# Patient Record
Sex: FEMALE | Employment: FULL TIME | URBAN - METROPOLITAN AREA
[De-identification: names, ages, dates, MRNs, and addresses within clinical notes are randomized per-mention and may not be internally consistent; named-entity substitution may affect disease eponyms.]

---

## 2017-03-17 ENCOUNTER — HOSPITAL ENCOUNTER (EMERGENCY)
Age: 37
Discharge: HOME | End: 2017-03-17
Attending: OBSTETRICS & GYNECOLOGY | Admitting: OBSTETRICS & GYNECOLOGY
Payer: COMMERCIAL

## 2017-03-17 VITALS
WEIGHT: 207 LBS | BODY MASS INDEX: 29.63 KG/M2 | SYSTOLIC BLOOD PRESSURE: 119 MMHG | TEMPERATURE: 98.4 F | HEART RATE: 94 BPM | DIASTOLIC BLOOD PRESSURE: 67 MMHG | HEIGHT: 70 IN | RESPIRATION RATE: 18 BRPM

## 2017-03-17 LAB
DEPRECATED S PYO AG THROAT QL EIA: NEGATIVE
FLUAV AG NPH QL IA: NEGATIVE
FLUBV AG NPH QL IA: POSITIVE

## 2017-03-17 PROCEDURE — 87804 INFLUENZA ASSAY W/OPTIC: CPT | Performed by: OBSTETRICS & GYNECOLOGY

## 2017-03-17 PROCEDURE — 87880 STREP A ASSAY W/OPTIC: CPT | Performed by: OBSTETRICS & GYNECOLOGY

## 2017-03-17 PROCEDURE — 59025 FETAL NON-STRESS TEST: CPT

## 2017-03-17 PROCEDURE — 87081 CULTURE SCREEN ONLY: CPT | Performed by: OBSTETRICS & GYNECOLOGY

## 2017-03-17 PROCEDURE — 99283 EMERGENCY DEPT VISIT LOW MDM: CPT

## 2017-03-17 RX ORDER — OSELTAMIVIR PHOSPHATE 75 MG/1
75 CAPSULE ORAL 2 TIMES DAILY
Qty: 10 CAP | Refills: 0 | Status: SHIPPED | OUTPATIENT
Start: 2017-03-17 | End: 2017-03-22

## 2017-03-17 NOTE — IP AVS SNAPSHOT
Summary of Care Report The Summary of Care report has been created to help improve care coordination. Users with access to [employer] or [address] (Web-based application) may access additional patient information including the Discharge Summary. If you are not currently a [address] user and need more information, please call the number listed below in the [place] [address] section and ask to be connected with Medical Records. Facility Information Name Address Phone [address] [place] [address] [address] [place] [id number] [phone] Patient Information Patient Name Sex EUGENIA Sandoval ([id number]) Female 1980 Discharge Information Admitting Provider Service Area Unit Johana Badillo MD / [facility] / [phone] Discharge Provider Discharge Date/Time Discharge Disposition Destination (none) 3/17/2017 (Pending) AHR (none) Patient Language Language ENGLISH [13] Hospital Problems as of 3/17/2017  Reviewed: 3/15/2017  8:49 AM by Lady Karoline MD  
 None Non-Hospital Problems as of 3/17/2017  Reviewed: 3/15/2017  8:49 AM by Lady Karoline MD  
  
  
  
 Class Noted - Resolved Last Modified Active Problems History of incompetent cervix, currently pregnant in first trimester  2016 - Present 2016 by Lauro Armstrong MD  
  Entered by Callie Ohara Overview Addendum 2016 12:15 PM by Lauro Armstrong MD  
   Cerclage placed on 10/31. 
2016 at [place]:  CL 3.8 cm 
2016 at [place]:  CL 4.5 cm, no PTL symptoms, no further prolapse, not using pessary. · Continue 17P weekly injections to 37 weeks. · No prolapse symptoms now, will continue to hold off on pessary. · PTL and cervical insufficiency precautions given. Previous  delivery with history of rapid labor  2016 - Present 2016 by Jourdan Mitchell MD  
  Entered by Ysabel Brandie Supervision of high-risk pregnancy  10/10/2016 - Present 2016 by Joudran Mitchell MD  
  Entered by Javier Martinez DO Overview Signed 10/12/2016 10:03 AM by Selam Carter MD  
   Poor OB history with cervical insufficiency [facility] Elderly multigravida in second trimester  10/12/2016 - Present 2016 by Jourdan Mitchell MD  
  Entered by Selam Carter MD  
  Overview Addendum 2016 12:16 PM by Jourdan Mitchell MD  
   Normal NT at 13wk; declined genetic testing.  at [facility]:  Normal Anatomy/fetal Echo · Continue Vitamin D 2000IU and ASA 81mg daily to aid in prevention of preeclampsia. · Stop ASA at 36 weeks; may continue Vitamin D until delivery. · Preeclamptic warnings given. Prolapse of female pelvic organs  2016 - Present 2016 by Jourdan Mitchell MD  
  Entered by Akbar Edward RN Overview Addendum 2016 10:17 AM by Akbar Edward RN Has Tony-Barron pessary; not currently in place. Patient denies bladder prolapse or difficulty voiding. You are allergic to the following No active allergies Current Discharge Medication List  
  
START taking these medications Dose & Instructions Dispensing Information Comments  
 oseltamivir 75 mg capsule Commonly known as:  TAMIFLU Dose:  75 mg Take 1 Cap by mouth two (2) times a day for 5 days. Quantity:  10 Cap Refills:  0 CONTINUE these medications which have NOT CHANGED Dose & Instructions Dispensing Information Comments  
 aspirin delayed-release 81 mg tablet Take  by mouth daily. Refills:  0  
   
 calcium carbonate 200 mg calcium (500 mg) Chew Commonly known as:  TUMS Dose:  1 Tab Take 1 Tab by mouth daily. Refills:  0 HYDROXYprogesterone caproate 250 mg/mL Syrg injection Dose:  250 mg  
250 mg by IntraMUSCular route every seven (7) days. Refills:  0 PRENATAL PO Take  by mouth. Refills:  0  
   
 TYLENOL EXTRA STRENGTH 500 mg tablet Generic drug:  acetaminophen Take  by mouth every six (6) hours as needed for Pain. Refills:  0  
   
 VITAMIN D3 2,000 unit Tab Generic drug:  cholecalciferol (vitamin D3) Take  by mouth. Refills:  0  
   
 ZANTAC 150 mg tablet Generic drug:  raNITIdine Dose:  150 mg Take 150 mg by mouth two (2) times a day. Refills:  0 ZyrTEC 10 mg Cap Generic drug:  Cetirizine Take  by mouth. Indications: as needed Refills:  0 Current Immunizations Name Date Tdap 7/10/2013 Follow-up Information Follow up With Details Comments Contact Info Provider Unknown   Patient not available to ask Discharge Instructions *** x 106/kg Pregnancy Precautions: Care Instructions Your Care Instructions There is no sure way to prevent labor before your due date ( labor) or to prevent most other pregnancy problems. But there are things you can do to increase your chances of a healthy pregnancy. Go to your appointments, follow your doctor's advice, and take good care of yourself. Eat well, and exercise (if your doctor agrees). And make sure to drink plenty of water. Follow-up care is a key part of your treatment and safety. Be sure to make and go to all appointments, and call your doctor if you are having problems. It's also a good idea to know your test results and keep a list of the medicines you take. How can you care for yourself at home? · Make sure you go to your prenatal appointments. At each visit, your doctor will check your blood pressure. Your doctor will also check to see if you have protein in your urine. High blood pressure and protein in urine are signs of preeclampsia. This condition can be dangerous for you and your baby. · Drink plenty of fluids, enough so that your urine is light yellow or clear like water. Dehydration can cause contractions. If you have kidney, heart, or liver disease and have to limit fluids, talk with your doctor before you increase the amount of fluids you drink. · Tell your doctor right away if you notice any symptoms of an infection, such as: ¨ Burning when you urinate. ¨ A foul-smelling discharge from your vagina. ¨ Vaginal itching. ¨ Unexplained fever. ¨ Unusual pain or soreness in your uterus or lower belly. · Eat a balanced diet. Include plenty of foods that are high in calcium and iron. ¨ Foods high in calcium include milk, cheese, yogurt, almonds, and broccoli. ¨ Foods high in iron include red meat, shellfish, poultry, eggs, beans, raisins, whole-grain bread, and leafy green vegetables. · Do not smoke. If you need help quitting, talk to your doctor about stop-smoking programs and medicines. These can increase your chances of quitting for good. · Do not drink alcohol or use illegal drugs. · Follow your doctor's directions about activity. Your doctor will let you know how much, if any, exercise you can do. · Ask your doctor if you can have sex. If you are at risk for early labor, your doctor may ask you to not have sex. · Take care to prevent falls. During pregnancy, your joints are loose, and your balance is off. Sports such as bicycling, skiing, or in-line skating can increase your risk of falling. And don't ride horses or motorcycles, dive, water ski, scuba dive, or parachute jump while you are pregnant. · Avoid getting very hot. Do not use saunas or hot tubs. Avoid staying out in the sun in hot weather for long periods. Take acetaminophen (Tylenol) to lower a high fever. · Do not take any over-the-counter or herbal medicines or supplements without talking to your doctor or pharmacist first. 
When should you call for help? Call 911 anytime you think you may need emergency care. For example, call if: 
· You passed out (lost consciousness). · You have severe vaginal bleeding. · You have severe pain in your belly or pelvis. · You have had fluid gushing or leaking from your vagina and you know or think the umbilical cord is bulging into your vagina. If this happens, immediately get down on your knees so your rear end (buttocks) is higher than your head. This will decrease the pressure on the cord until help arrives. Call your doctor now or seek immediate medical care if: 
· You have signs of preeclampsia, such as: 
¨ Sudden swelling of your face, hands, or feet. ¨ New vision problems (such as dimness or blurring). ¨ A severe headache. · You have any vaginal bleeding. · You have belly pain or cramping. · You have a fever. · You have had regular contractions (with or without pain) for an hour. This means that you have 8 or more within 1 hour or 4 or more in 20 minutes after you change your position and drink fluids. · You have a sudden release of fluid from your vagina. · You have low back pain or pelvic pressure that does not go away. · You notice that your baby has stopped moving or is moving much less than normal. 
Watch closely for changes in your health, and be sure to contact your doctor if you have any problems. Where can you learn more? Go to http://héctor-shilo.info/. Enter [id number] in the search box to learn more about \"Pregnancy Precautions: Care Instructions. \" Current as of: May 30, 2016 Content Version: 11.1 © 7218-6604 [employer]. Care instructions adapted under license by [employer] (which disclaims liability or warranty for this information). If you have questions about a medical condition or this instruction, always ask your healthcare professional. [address] any warranty or liability for your use of this information. Chart Review Routing History No Routing History on File

## 2017-03-17 NOTE — IP AVS SNAPSHOT
Levon Purdy 
 
 
 [address] 
[phone] Patient: Nile Dunn 
MRN: [id number] You are allergic to the following No active allergies Recent Documentation Height Weight BMI OB Status Smoking Status 1.778 m 93.9 kg 29.7 kg/m2 Pregnant Never Smoker Unresulted Labs Order Current Status CULTURE, STREP THROAT In process Emergency Contacts Name Discharge Info Relation Home Work Mobile Grover Bosch  Spouse [3] [phone] About your hospitalization You were admitted on:  N/A You last received care in the:  E 4 ANTEPARTUM You were discharged on:  March 17, 2017 Unit phone number:  [phone] Why you were hospitalized Your primary diagnosis was:  Not on File Providers Seen During Your Hospitalizations Provider Role Specialty Primary office phone Norma Marrufo MD Attending Provider Obstetrics & Gynecology [phone] Your Primary Care Physician (PCP) Primary Care Physician Office Phone Office Fax UNKNOWN, PROVIDER ** None ** ** None ** Follow-up Information Follow up With Details Comments Contact Info Provider Unknown   Patient not available to ask Your Appointments Wednesday March 22, 2017  8:30 AM EDT  
OB VISIT with Jesse Morrow DO  
[address] 815 [facility] [address] [address] [phone] Wednesday April 05, 2017  1:30 PM EDT  
GROWTH with [facility] ULTRASOUND 2  
[address] ([address] [place] [address] Street) [address] [address] [phone] Wednesday April 05, 2017  2:15 PM EDT  
OB VISIT with Leila Palomo MD  
[address] ([address]) [address] 187 [facility] [address] [phone] Current Discharge Medication List  
  
START taking these medications Dose & Instructions Dispensing Information Comments Morning Noon Evening Bedtime  
 oseltamivir 75 mg capsule Commonly known as:  TAMIFLU Your last dose was: Your next dose is:    
   
   
 Dose:  75 mg Take 1 Cap by mouth two (2) times a day for 5 days. Quantity:  10 Cap Refills:  0 CONTINUE these medications which have NOT CHANGED Dose & Instructions Dispensing Information Comments Morning Noon Evening Bedtime  
 aspirin delayed-release 81 mg tablet Your last dose was: Your next dose is: Take  by mouth daily. Refills:  0  
     
   
   
   
  
 calcium carbonate 200 mg calcium (500 mg) Chew Commonly known as:  TUMS Your last dose was: Your next dose is:    
   
   
 Dose:  1 Tab Take 1 Tab by mouth daily. Refills:  0 HYDROXYprogesterone caproate 250 mg/mL Syrg injection Your last dose was: Your next dose is:    
   
   
 Dose:  250 mg  
250 mg by IntraMUSCular route every seven (7) days. Refills:  0 PRENATAL PO Your last dose was: Your next dose is: Take  by mouth. Refills:  0  
     
   
   
   
  
 TYLENOL EXTRA STRENGTH 500 mg tablet Generic drug:  acetaminophen Your last dose was: Your next dose is: Take  by mouth every six (6) hours as needed for Pain. Refills:  0  
     
   
   
   
  
 VITAMIN D3 2,000 unit Tab Generic drug:  cholecalciferol (vitamin D3) Your last dose was: Your next dose is: Take  by mouth. Refills:  0  
     
   
   
   
  
 ZANTAC 150 mg tablet Generic drug:  raNITIdine Your last dose was: Your next dose is:    
   
   
 Dose:  150 mg Take 150 mg by mouth two (2) times a day. Refills:  0 ZyrTEC 10 mg Cap Generic drug:  Cetirizine Your last dose was: Your next dose is: Take  by mouth. Indications: as needed Refills:  0 Where to Get Your Medications Information on where to get these meds will be given to you by the nurse or doctor. ! Ask your nurse or doctor about these medications  
  oseltamivir 75 mg capsule Discharge Instructions *** x 106/kg Pregnancy Precautions: Care Instructions Your Care Instructions There is no sure way to prevent labor before your due date ( labor) or to prevent most other pregnancy problems. But there are things you can do to increase your chances of a healthy pregnancy. Go to your appointments, follow your doctor's advice, and take good care of yourself. Eat well, and exercise (if your doctor agrees). And make sure to drink plenty of water. Follow-up care is a key part of your treatment and safety. Be sure to make and go to all appointments, and call your doctor if you are having problems. It's also a good idea to know your test results and keep a list of the medicines you take. How can you care for yourself at home? · Make sure you go to your prenatal appointments. At each visit, your doctor will check your blood pressure. Your doctor will also check to see if you have protein in your urine. High blood pressure and protein in urine are signs of preeclampsia. This condition can be dangerous for you and your baby. · Drink plenty of fluids, enough so that your urine is light yellow or clear like water. Dehydration can cause contractions. If you have kidney, heart, or liver disease and have to limit fluids, talk with your doctor before you increase the amount of fluids you drink. · Tell your doctor right away if you notice any symptoms of an infection, such as: ¨ Burning when you urinate. ¨ A foul-smelling discharge from your vagina. ¨ Vaginal itching. ¨ Unexplained fever. ¨ Unusual pain or soreness in your uterus or lower belly. · Eat a balanced diet. Include plenty of foods that are high in calcium and iron. ¨ Foods high in calcium include milk, cheese, yogurt, almonds, and broccoli. ¨ Foods high in iron include red meat, shellfish, poultry, eggs, beans, raisins, whole-grain bread, and leafy green vegetables. · Do not smoke. If you need help quitting, talk to your doctor about stop-smoking programs and medicines. These can increase your chances of quitting for good. · Do not drink alcohol or use illegal drugs. · Follow your doctor's directions about activity. Your doctor will let you know how much, if any, exercise you can do. · Ask your doctor if you can have sex. If you are at risk for early labor, your doctor may ask you to not have sex. · Take care to prevent falls. During pregnancy, your joints are loose, and your balance is off. Sports such as bicycling, skiing, or in-line skating can increase your risk of falling. And don't ride horses or motorcycles, dive, water ski, scuba dive, or parachute jump while you are pregnant. · Avoid getting very hot. Do not use saunas or hot tubs. Avoid staying out in the sun in hot weather for long periods. Take acetaminophen (Tylenol) to lower a high fever. · Do not take any over-the-counter or herbal medicines or supplements without talking to your doctor or pharmacist first. 
When should you call for help? Call 911 anytime you think you may need emergency care. For example, call if: 
· You passed out (lost consciousness). · You have severe vaginal bleeding. · You have severe pain in your belly or pelvis. · You have had fluid gushing or leaking from your vagina and you know or think the umbilical cord is bulging into your vagina. If this happens, immediately get down on your knees so your rear end (buttocks) is higher than your head. This will decrease the pressure on the cord until help arrives. Call your doctor now or seek immediate medical care if: 
· You have signs of preeclampsia, such as: 
¨ Sudden swelling of your face, hands, or feet. ¨ New vision problems (such as dimness or blurring). ¨ A severe headache. · You have any vaginal bleeding. · You have belly pain or cramping. · You have a fever. · You have had regular contractions (with or without pain) for an hour. This means that you have 8 or more within 1 hour or 4 or more in 20 minutes after you change your position and drink fluids. · You have a sudden release of fluid from your vagina. · You have low back pain or pelvic pressure that does not go away. · You notice that your baby has stopped moving or is moving much less than normal. 
Watch closely for changes in your health, and be sure to contact your doctor if you have any problems. Where can you learn more? Go to http://héctor-shilo.info/. Enter [id number] in the search box to learn more about \"Pregnancy Precautions: Care Instructions. \" Current as of: May 30, 2016 Content Version: 11.1 © 1937-1202 "[employer] Care instructions adapted under license by [employer] (which disclaims liability or warranty for this information). If you have questions about a medical condition or this instruction, always ask your healthcare professional. [address] any warranty or liability for your use of this information. Discharge Orders None Introducing [facility]! Alanis Hinds introduces [employer] patient portal. Now you can access parts of your medical record, email your doctor's office, and request medication refills online. 1. In your internet browser, go to [email or web address] 2. Click on the First Time User? Click Here link in the Sign In box. You will see the New Member Sign Up page. 3. Enter your [employer] Access Code exactly as it appears below.  You will not need to use this code after youve completed the sign-up process. If you do not sign up before the expiration date, you must request a new code. · [employer] Access Code: [email or web address] Expires: 6/13/2017  9:05 AM 
 
4. Enter the last four digits of your Social Security Number (xxxx) and Date of Birth (mm/dd/yyyy) as indicated and click Submit. You will be taken to the next sign-up page. 5. Create a [employer] ID. This will be your [employer] login ID and cannot be changed, so think of one that is secure and easy to remember. 6. Create a [employer] password. You can change your password at any time. 7. Enter your Password Reset Question and Answer. This can be used at a later time if you forget your password. 8. Enter your e-mail address. You will receive e-mail notification when new information is available in [address] 9. Click Sign Up. You can now view and download portions of your medical record. 10. Click the Download Summary menu link to download a portable copy of your medical information. If you have questions, please visit the Frequently Asked Questions section of the [employer] website. Remember, [employer] is NOT to be used for urgent needs. For medical emergencies, dial 911. Now available from your iPhone and Android! General Information Please provide this summary of care documentation to your next provider. Patient Signature:  ____________________________________________________________ Date:  ____________________________________________________________  
  
Cathryn Riri Provider Signature:  ____________________________________________________________ Date:  ____________________________________________________________

## 2017-03-18 NOTE — PROGRESS NOTES
Patient presents to [facility] triage with c/o fever, fatigue, sore throat, joint pain. Denies contractions, LOF, vaginal bleeding. EFM on.   Dr Juana Paige aware of patient's arrival.

## 2017-03-18 NOTE — DISCHARGE INSTRUCTIONS
*** x 106/kg     Pregnancy Precautions: Care Instructions  Your Care Instructions  There is no sure way to prevent labor before your due date ( labor) or to prevent most other pregnancy problems. But there are things you can do to increase your chances of a healthy pregnancy. Go to your appointments, follow your doctor's advice, and take good care of yourself. Eat well, and exercise (if your doctor agrees). And make sure to drink plenty of water. Follow-up care is a key part of your treatment and safety. Be sure to make and go to all appointments, and call your doctor if you are having problems. It's also a good idea to know your test results and keep a list of the medicines you take. How can you care for yourself at home? · Make sure you go to your prenatal appointments. At each visit, your doctor will check your blood pressure. Your doctor will also check to see if you have protein in your urine. High blood pressure and protein in urine are signs of preeclampsia. This condition can be dangerous for you and your baby. · Drink plenty of fluids, enough so that your urine is light yellow or clear like water. Dehydration can cause contractions. If you have kidney, heart, or liver disease and have to limit fluids, talk with your doctor before you increase the amount of fluids you drink. · Tell your doctor right away if you notice any symptoms of an infection, such as:  ¨ Burning when you urinate. ¨ A foul-smelling discharge from your vagina. ¨ Vaginal itching. ¨ Unexplained fever. ¨ Unusual pain or soreness in your uterus or lower belly. · Eat a balanced diet. Include plenty of foods that are high in calcium and iron. ¨ Foods high in calcium include milk, cheese, yogurt, almonds, and broccoli. ¨ Foods high in iron include red meat, shellfish, poultry, eggs, beans, raisins, whole-grain bread, and leafy green vegetables. · Do not smoke.  If you need help quitting, talk to your doctor about stop-smoking programs and medicines. These can increase your chances of quitting for good. · Do not drink alcohol or use illegal drugs. · Follow your doctor's directions about activity. Your doctor will let you know how much, if any, exercise you can do. · Ask your doctor if you can have sex. If you are at risk for early labor, your doctor may ask you to not have sex. · Take care to prevent falls. During pregnancy, your joints are loose, and your balance is off. Sports such as bicycling, skiing, or in-line skating can increase your risk of falling. And don't ride horses or motorcycles, dive, water ski, scuba dive, or parachute jump while you are pregnant. · Avoid getting very hot. Do not use saunas or hot tubs. Avoid staying out in the sun in hot weather for long periods. Take acetaminophen (Tylenol) to lower a high fever. · Do not take any over-the-counter or herbal medicines or supplements without talking to your doctor or pharmacist first.  When should you call for help? Call 911 anytime you think you may need emergency care. For example, call if:  · You passed out (lost consciousness). · You have severe vaginal bleeding. · You have severe pain in your belly or pelvis. · You have had fluid gushing or leaking from your vagina and you know or think the umbilical cord is bulging into your vagina. If this happens, immediately get down on your knees so your rear end (buttocks) is higher than your head. This will decrease the pressure on the cord until help arrives. Call your doctor now or seek immediate medical care if:  · You have signs of preeclampsia, such as:  ¨ Sudden swelling of your face, hands, or feet. ¨ New vision problems (such as dimness or blurring). ¨ A severe headache. · You have any vaginal bleeding. · You have belly pain or cramping. · You have a fever. · You have had regular contractions (with or without pain) for an hour.  This means that you have 8 or more within 1 hour or 4 or more in 20 minutes after you change your position and drink fluids. · You have a sudden release of fluid from your vagina. · You have low back pain or pelvic pressure that does not go away. · You notice that your baby has stopped moving or is moving much less than normal.  Watch closely for changes in your health, and be sure to contact your doctor if you have any problems. Where can you learn more? Go to http://héctor-shilo.info/. Enter [id number] in the search box to learn more about \"Pregnancy Precautions: Care Instructions. \"  Current as of: May 30, 2016  Content Version: 11.1  © 0588-8346 [employer]. Care instructions adapted under license by [employer] (which disclaims liability or warranty for this information). If you have questions about a medical condition or this instruction, always ask your healthcare professional. [address] any warranty or liability for your use of this information.

## 2017-03-18 NOTE — PROGRESS NOTES
Patient discharged to home undelivered ambulatory accompanied by  with Rx for Tamiflu and discharge instructions.

## 2017-03-18 NOTE — ED PROVIDER NOTES
Chief Complaint:fever and achiness      29 y.o. female [id number] at 35w4d  weeks gestation who is seen for fever and body aches. .  C/o sore throat and generalized achiness since yesterday.  and daughter were ill earlier in the week. Some sinus congestion. No cough. No contractions. Good fetal movmement. No vaginal pain, bleeding or discharge. No GI complaints. HISTORY:    History   Sexual Activity    Sexual activity: Yes    Partners: Male    Birth control/ protection: None     Patient's last menstrual period was 2016 (exact date). Social History     Social History    Marital status:      Spouse name: Jefferson Roman    Number of children: N/A    Years of education: N/A     Occupational History    sales      Social History Main Topics    Smoking status: Never Smoker    Smokeless tobacco: Never Used    Alcohol use No      Comment: occ    Drug use: No    Sexual activity: Yes     Partners: Male     Birth control/ protection: None     Other Topics Concern    Not on file     Social History Narrative       Past Surgical History:   Procedure Laterality Date    HX BACK SURGERY      HX OTHER SURGICAL      cerclage x2    HX WISDOM TEETH EXTRACTION      REVISION CERVIX W PREG,VAG APPRCH      had cerclage placed x3       Past Medical History:   Diagnosis Date    Asthma     as a child    Incompetent cervix     Lumbar herniated disc     l5/s1    Migraines      delivery      @22 weeks G2    Supervision of high-risk pregnancy 10/10/2016         ROS:  A 12 point review of symptoms negative except for chief complaint as described above. PHYSICAL EXAM:  Blood pressure 119/67, pulse 94, temperature 98.4 °F (36.9 °C), resp. rate 18, height 5' 10\" (1.778 m), weight 93.9 kg (207 lb), last menstrual period 2016, unknown if currently breastfeeding. The patient appears well, alert, oriented x 3. Lungs are clear. Heart RRR, no murmurs. No sinus tenderness.  Throat with erythema , no exudate  Abdomen soft, nontender, no guarding  No cva tenderness  No fundal tenderness  Upper ext: no edema, reflexes +2  Lower ext: no edema, neg familia's, reflexes +2  Skin: no rashes or lesions  Mood/ Affect: appropriate    FHT: fht's 120's with accels, mod variability, no decels  TOCO:no contractions  Recent Results (from the past 12 hour(s))   INFLUENZA A & B AG (RAPID TEST)    Collection Time: 17  7:50 PM   Result Value Ref Range    Influenza A Ag NEGATIVE  NEG      Influenza B Ag POSITIVE (A) NEG     STREP AG SCREEN, GROUP A    Collection Time: 17  7:50 PM   Result Value Ref Range    Group A Strep Ag ID NEGATIVE  NEG           Assessment/Plan:  35yo  at 35w4d with + flu  rx tamiflu  F/u if not improved  Cat 1 tracing with no contractions

## 2017-03-20 LAB
BACTERIA SPEC CULT: ABNORMAL
SERVICE CMNT-IMP: ABNORMAL

## 2017-04-09 ENCOUNTER — HOSPITAL ENCOUNTER (INPATIENT)
Age: 37
LOS: 2 days | Discharge: HOME | End: 2017-04-11
Attending: OBSTETRICS & GYNECOLOGY | Admitting: OBSTETRICS & GYNECOLOGY
Payer: COMMERCIAL

## 2017-04-09 ENCOUNTER — ANESTHESIA EVENT (OUTPATIENT)
Dept: LABOR AND DELIVERY | Age: 37
End: 2017-04-09
Payer: COMMERCIAL

## 2017-04-09 ENCOUNTER — ANESTHESIA (OUTPATIENT)
Dept: LABOR AND DELIVERY | Age: 37
End: 2017-04-09
Payer: COMMERCIAL

## 2017-04-09 PROBLEM — Z37.9 NORMAL LABOR: Status: ACTIVE | Noted: 2017-04-09

## 2017-04-09 LAB
ABO + RH BLD: NORMAL
BASE DEFICIT BLDCOA-SCNC: 0.1 MMOL/L (ref 0–2)
BASE DEFICIT BLDCOV-SCNC: 0.1 MMOL/L (ref 1.9–7.7)
BDY SITE: ABNORMAL
BDY SITE: ABNORMAL
BLOOD GROUP ANTIBODIES SERPL: NORMAL
ERYTHROCYTE [DISTWIDTH] IN BLOOD BY AUTOMATED COUNT: 14 % (ref 11.9–14.6)
HCO3 BLDCOA-SCNC: 25 MMOL/L (ref 22–26)
HCO3 BLDV-SCNC: 22 MMOL/L
HCT VFR BLD AUTO: 38.3 % (ref 35.8–46.3)
HGB BLD-MCNC: 12.9 G/DL (ref 11.7–15.4)
MCH RBC QN AUTO: 30.2 PG (ref 26.1–32.9)
MCHC RBC AUTO-ENTMCNC: 33.7 G/DL (ref 31.4–35)
MCV RBC AUTO: 89.7 FL (ref 79.6–97.8)
PCO2 BLDCOA: 42 MMHG (ref 33–49)
PCO2 BLDCOV: 29 MMHG (ref 14.1–43.3)
PH BLDCOA: 7.39 [PH] (ref 7.21–7.31)
PH BLDCOV: 7.5 [PH] (ref 7.2–7.44)
PLATELET # BLD AUTO: 178 K/UL (ref 150–450)
PMV BLD AUTO: 14 FL (ref 10.8–14.1)
PO2 BLDCOA: 17 MMHG (ref 9–19)
PO2 BLDV: 24 MMHG (ref 30.4–57.2)
RBC # BLD AUTO: 4.27 M/UL (ref 4.05–5.25)
SERVICE CMNT-IMP: ABNORMAL
SERVICE CMNT-IMP: ABNORMAL
SPECIMEN EXP DATE BLD: NORMAL
WBC # BLD AUTO: 13 K/UL (ref 4.3–11.1)

## 2017-04-09 PROCEDURE — 75410000000 HC DELIVERY VAGINAL/SINGLE: Performed by: OBSTETRICS & GYNECOLOGY

## 2017-04-09 PROCEDURE — 75410000003 HC RECOV DEL/VAG/CSECN EA 0.5 HR

## 2017-04-09 PROCEDURE — 77030014125 HC TY EPDRL BBMI -B: Performed by: ANESTHESIOLOGY

## 2017-04-09 PROCEDURE — 82803 BLOOD GASES ANY COMBINATION: CPT

## 2017-04-09 PROCEDURE — 75410000002 HC LABOR FEE PER 1 HR: Performed by: OBSTETRICS & GYNECOLOGY

## 2017-04-09 PROCEDURE — 10907ZC DRAINAGE OF AMNIOTIC FLUID, THERAPEUTIC FROM PRODUCTS OF CONCEPTION, VIA NATURAL OR ARTIFICIAL OPENING: ICD-10-PCS | Performed by: OBSTETRICS & GYNECOLOGY

## 2017-04-09 PROCEDURE — 74011250636 HC RX REV CODE- 250/636: Performed by: OBSTETRICS & GYNECOLOGY

## 2017-04-09 PROCEDURE — 75410000003 HC RECOV DEL/VAG/CSECN EA 0.5 HR: Performed by: OBSTETRICS & GYNECOLOGY

## 2017-04-09 PROCEDURE — 77030011943

## 2017-04-09 PROCEDURE — 99282 EMERGENCY DEPT VISIT SF MDM: CPT

## 2017-04-09 PROCEDURE — 74011258636 HC RX REV CODE- 258/636: Performed by: OBSTETRICS & GYNECOLOGY

## 2017-04-09 PROCEDURE — A4300 CATH IMPL VASC ACCESS PORTAL: HCPCS | Performed by: ANESTHESIOLOGY

## 2017-04-09 PROCEDURE — 76060000078 HC EPIDURAL ANESTHESIA: Performed by: OBSTETRICS & GYNECOLOGY

## 2017-04-09 PROCEDURE — 77030007880 HC KT SPN EPDRL BBMI -B: Performed by: ANESTHESIOLOGY

## 2017-04-09 PROCEDURE — 75410000000 HC DELIVERY VAGINAL/SINGLE

## 2017-04-09 PROCEDURE — 85027 COMPLETE CBC AUTOMATED: CPT | Performed by: OBSTETRICS & GYNECOLOGY

## 2017-04-09 PROCEDURE — 76060000078 HC EPIDURAL ANESTHESIA

## 2017-04-09 PROCEDURE — 74011250636 HC RX REV CODE- 250/636

## 2017-04-09 PROCEDURE — 4A1HXCZ MONITORING OF PRODUCTS OF CONCEPTION, CARDIAC RATE, EXTERNAL APPROACH: ICD-10-PCS | Performed by: OBSTETRICS & GYNECOLOGY

## 2017-04-09 PROCEDURE — 77030018846 HC SOL IRR STRL H20 ICUM -A

## 2017-04-09 PROCEDURE — 65270000029 HC RM PRIVATE

## 2017-04-09 PROCEDURE — 86900 BLOOD TYPING SEROLOGIC ABO: CPT | Performed by: OBSTETRICS & GYNECOLOGY

## 2017-04-09 RX ORDER — DIPHENHYDRAMINE HCL 25 MG
25 CAPSULE ORAL
Status: DISCONTINUED | OUTPATIENT
Start: 2017-04-09 | End: 2017-04-11 | Stop reason: HOSPADM

## 2017-04-09 RX ORDER — OXYCODONE AND ACETAMINOPHEN 5; 325 MG/1; MG/1
1 TABLET ORAL
Status: DISCONTINUED | OUTPATIENT
Start: 2017-04-09 | End: 2017-04-11 | Stop reason: HOSPADM

## 2017-04-09 RX ORDER — ROPIVACAINE HYDROCHLORIDE 5 MG/ML
INJECTION, SOLUTION EPIDURAL; INFILTRATION; PERINEURAL AS NEEDED
Status: DISCONTINUED | OUTPATIENT
Start: 2017-04-09 | End: 2017-04-09 | Stop reason: HOSPADM

## 2017-04-09 RX ORDER — SODIUM CHLORIDE 0.9 % (FLUSH) 0.9 %
5-10 SYRINGE (ML) INJECTION AS NEEDED
Status: DISCONTINUED | OUTPATIENT
Start: 2017-04-09 | End: 2017-04-09

## 2017-04-09 RX ORDER — DOCUSATE SODIUM 100 MG/1
100 CAPSULE, LIQUID FILLED ORAL 2 TIMES DAILY
Status: DISCONTINUED | OUTPATIENT
Start: 2017-04-10 | End: 2017-04-11 | Stop reason: HOSPADM

## 2017-04-09 RX ORDER — FENTANYL CITRATE 50 UG/ML
100 INJECTION, SOLUTION INTRAMUSCULAR; INTRAVENOUS ONCE
Status: DISCONTINUED | OUTPATIENT
Start: 2017-04-09 | End: 2017-04-09

## 2017-04-09 RX ORDER — LIDOCAINE HYDROCHLORIDE 10 MG/ML
1 INJECTION INFILTRATION; PERINEURAL
Status: DISCONTINUED | OUTPATIENT
Start: 2017-04-09 | End: 2017-04-09 | Stop reason: HOSPADM

## 2017-04-09 RX ORDER — IBUPROFEN 800 MG/1
800 TABLET ORAL
Status: DISCONTINUED | OUTPATIENT
Start: 2017-04-09 | End: 2017-04-11 | Stop reason: HOSPADM

## 2017-04-09 RX ORDER — ROPIVACAINE HYDROCHLORIDE 2 MG/ML
INJECTION, SOLUTION EPIDURAL; INFILTRATION; PERINEURAL
Status: DISCONTINUED | OUTPATIENT
Start: 2017-04-09 | End: 2017-04-09 | Stop reason: HOSPADM

## 2017-04-09 RX ORDER — FENTANYL CITRATE 50 UG/ML
INJECTION, SOLUTION INTRAMUSCULAR; INTRAVENOUS AS NEEDED
Status: DISCONTINUED | OUTPATIENT
Start: 2017-04-09 | End: 2017-04-09 | Stop reason: HOSPADM

## 2017-04-09 RX ORDER — CHLOROPROCAINE HYDROCHLORIDE 30 MG/ML
INJECTION, SOLUTION EPIDURAL; INFILTRATION; INTRACAUDAL; PERINEURAL AS NEEDED
Status: DISCONTINUED | OUTPATIENT
Start: 2017-04-09 | End: 2017-04-09 | Stop reason: HOSPADM

## 2017-04-09 RX ORDER — ONDANSETRON 4 MG/1
4 TABLET, ORALLY DISINTEGRATING ORAL
Status: DISCONTINUED | OUTPATIENT
Start: 2017-04-09 | End: 2017-04-11 | Stop reason: HOSPADM

## 2017-04-09 RX ORDER — SODIUM CHLORIDE 0.9 % (FLUSH) 0.9 %
5-10 SYRINGE (ML) INJECTION EVERY 8 HOURS
Status: DISCONTINUED | OUTPATIENT
Start: 2017-04-09 | End: 2017-04-09

## 2017-04-09 RX ORDER — SIMETHICONE 80 MG
80 TABLET,CHEWABLE ORAL
Status: DISCONTINUED | OUTPATIENT
Start: 2017-04-09 | End: 2017-04-11 | Stop reason: HOSPADM

## 2017-04-09 RX ORDER — LIDOCAINE HYDROCHLORIDE 20 MG/ML
JELLY TOPICAL
Status: DISCONTINUED | OUTPATIENT
Start: 2017-04-09 | End: 2017-04-09 | Stop reason: HOSPADM

## 2017-04-09 RX ORDER — FENTANYL CITRATE 50 UG/ML
INJECTION, SOLUTION INTRAMUSCULAR; INTRAVENOUS
Status: DISCONTINUED
Start: 2017-04-09 | End: 2017-04-09

## 2017-04-09 RX ORDER — BUTORPHANOL TARTRATE 1 MG/ML
1 INJECTION INTRAMUSCULAR; INTRAVENOUS
Status: DISCONTINUED | OUTPATIENT
Start: 2017-04-09 | End: 2017-04-09 | Stop reason: HOSPADM

## 2017-04-09 RX ORDER — DEXTROSE, SODIUM CHLORIDE, SODIUM LACTATE, POTASSIUM CHLORIDE, AND CALCIUM CHLORIDE 5; .6; .31; .03; .02 G/100ML; G/100ML; G/100ML; G/100ML; G/100ML
125 INJECTION, SOLUTION INTRAVENOUS CONTINUOUS
Status: DISCONTINUED | OUTPATIENT
Start: 2017-04-09 | End: 2017-04-09

## 2017-04-09 RX ORDER — MINERAL OIL
120 OIL (ML) ORAL
Status: DISCONTINUED | OUTPATIENT
Start: 2017-04-09 | End: 2017-04-09 | Stop reason: HOSPADM

## 2017-04-09 RX ORDER — SODIUM CHLORIDE, SODIUM LACTATE, POTASSIUM CHLORIDE, CALCIUM CHLORIDE 600; 310; 30; 20 MG/100ML; MG/100ML; MG/100ML; MG/100ML
1000 INJECTION, SOLUTION INTRAVENOUS ONCE
Status: COMPLETED | OUTPATIENT
Start: 2017-04-09 | End: 2017-04-09

## 2017-04-09 RX ORDER — OXYTOCIN/RINGER'S LACTATE 15/250 ML
250 PLASTIC BAG, INJECTION (ML) INTRAVENOUS ONCE
Status: COMPLETED | OUTPATIENT
Start: 2017-04-09 | End: 2017-04-09

## 2017-04-09 RX ADMIN — CHLOROPROCAINE HYDROCHLORIDE 10 ML: 30 INJECTION, SOLUTION EPIDURAL; INFILTRATION; INTRACAUDAL; PERINEURAL at 21:42

## 2017-04-09 RX ADMIN — ROPIVACAINE HYDROCHLORIDE 7 ML: 5 INJECTION, SOLUTION EPIDURAL; INFILTRATION; PERINEURAL at 21:32

## 2017-04-09 RX ADMIN — SODIUM CHLORIDE, SODIUM LACTATE, POTASSIUM CHLORIDE, AND CALCIUM CHLORIDE 1000 ML: 600; 310; 30; 20 INJECTION, SOLUTION INTRAVENOUS at 21:01

## 2017-04-09 RX ADMIN — FENTANYL CITRATE 85 MCG: 50 INJECTION, SOLUTION INTRAMUSCULAR; INTRAVENOUS at 21:30

## 2017-04-09 RX ADMIN — Medication 10 ML: at 21:01

## 2017-04-09 RX ADMIN — Medication 15000 MILLI-UNITS/HR: at 21:59

## 2017-04-09 RX ADMIN — SODIUM CHLORIDE, SODIUM LACTATE, POTASSIUM CHLORIDE, CALCIUM CHLORIDE, AND DEXTROSE MONOHYDRATE 125 ML/HR: 600; 310; 30; 20; 5 INJECTION, SOLUTION INTRAVENOUS at 21:00

## 2017-04-09 RX ADMIN — ROPIVACAINE HYDROCHLORIDE 10 ML/HR: 2 INJECTION, SOLUTION EPIDURAL; INFILTRATION; PERINEURAL at 21:32

## 2017-04-09 RX ADMIN — FENTANYL CITRATE 15 MCG: 50 INJECTION, SOLUTION INTRAMUSCULAR; INTRAVENOUS at 21:29

## 2017-04-09 NOTE — PROGRESS NOTES
Pt presented top L&D complaining of uterine contractions. Dr Yohan Blas at bedside to assess. Pt with history of cervical incompetence. Cerclage removed this past Wednesday. SVE 4/90  Pt to walk for one hour and recheck.

## 2017-04-09 NOTE — IP AVS SNAPSHOT
[address] [place]  
[phone] Patient: Tamela Woods 
MRN: [id number] YVI:[id number] You are allergic to the following No active allergies Recent Documentation Height Weight Breastfeeding? BMI OB Status Smoking Status 1.778 m 93.9 kg Yes 29.7 kg/m2 Recent pregnancy Never Smoker Emergency Contacts Name Discharge Info Relation Home Work Mobile Grover Bosch  Spouse [3] [phone] About your hospitalization You were admitted on:  April 9, 2017 You last received care in the:  [address] [facility] You were discharged on:  April 11, 2017 Unit phone number:  [phone] Why you were hospitalized Your primary diagnosis was:  Normal Labor Providers Seen During Your Hospitalizations Provider Role Specialty Primary office phone Rogeloi Call MD Attending Provider Obstetrics & Gynecology [phone] Your Primary Care Physician (PCP) Primary Care Physician Office Phone Office Fax UNKNOWN, PROVIDER ** None ** ** None ** Follow-up Information Follow up With Details Comments Contact Info Provider Unknown   Patient not available to ask Rogelio Call MD In 2 weeks Call and schedule an appointment for a 2 week follow up appointment with [facility]. [address] [place] [address] OB GYN Group [facility] [address] 
[phone] Current Discharge Medication List  
  
START taking these medications Dose & Instructions Dispensing Information Comments Morning Noon Evening Bedtime  
 ibuprofen 800 mg tablet Commonly known as:  MOTRIN Your last dose was: Your next dose is:    
   
   
 Dose:  800 mg Take 1 Tab by mouth every eight (8) hours as needed. Quantity:  35 Tab Refills:  1 CONTINUE these medications which have NOT CHANGED Dose & Instructions Dispensing Information Comments Morning Noon Evening Bedtime PRENATAL PO Your last dose was: Your next dose is: Take  by mouth. Refills:  0 STOP taking these medications   
 aspirin delayed-release 81 mg tablet  
   
  
 calcium carbonate 200 mg calcium (500 mg) Chew Commonly known as:  TUMS HYDROXYprogesterone caproate 250 mg/mL Syrg injection TYLENOL EXTRA STRENGTH 500 mg tablet Generic drug:  acetaminophen VITAMIN D3 2,000 unit Tab Generic drug:  cholecalciferol (vitamin D3) ZANTAC 150 mg tablet Generic drug:  raNITIdine ZyrTEC 10 mg Cap Generic drug:  Cetirizine Where to Get Your Medications These medications were sent to [facility]/pharmacy #[id number]  [address] Phone:  [phone]  
  ibuprofen 800 mg tablet Discharge Instructions Vaginal Childbirth: Care Instructions Your Care Instructions Your body will slowly heal in the next few weeks. It is easy to get too tired and overwhelmed during the first weeks after your baby is born. Changes in your hormones can shift your mood without warning. You may find it hard to meet the extra demands on your energy and time. Take it easy on yourself. Follow-up care is a key part of your treatment and safety. Be sure to make and go to all appointments, and call your doctor if you are having problems. It's also a good idea to know your test results and keep a list of the medicines you take. How can you care for yourself at home? · Vaginal bleeding and cramps ¨ After delivery, you will have a bloody discharge from the vagina. This will turn pink within a week and then white or yellow after about 10 days. It may last for 2 to 4 weeks or longer, until the uterus has healed. Use pads instead of tampons until you stop bleeding. ¨ Do not worry if you pass some blood clots, as long as they are smaller than a golf ball. If you have a tear or stitches in your vaginal area, change the pad at least every 4 hours to prevent soreness and infection. ¨ You may have cramps for the first few days after childbirth. These are normal and occur as the uterus shrinks to normal size. Take an over-the-counter pain medicine, such as acetaminophen (Tylenol), ibuprofen (Advil, Motrin), or naproxen (Aleve), for cramps. Read and follow all instructions on the label. Do not take aspirin, because it can cause more bleeding. ¨ Do not take two or more pain medicines at the same time unless the doctor told you to. Many pain medicines have acetaminophen, which is Tylenol. Too much acetaminophen (Tylenol) can be harmful. · Stitches ¨ If you have stitches, they will dissolve on their own and do not need to be removed. Follow your doctor's instructions for cleaning the stitched area. ¨ Put ice or a cold pack on your painful area for 10 to 20 minutes at a time, several times a day, for the first few days. Put a thin cloth between the ice and your skin. ¨ Sit in a few inches of warm water (sitz bath) 3 times a day and after bowel movements. The warm water helps with pain and itching. If you do not have a tub, a warm shower might help. · Breast fullness ¨ Your breasts may overfill (engorge) in the first few days after delivery. To help milk flow and to relieve pain, warm your breasts in the shower or by using warm, moist towels before nursing. ¨ If you are not nursing, do not put warmth on your breasts or touch your breasts. Wear a tight bra or sports bra and use ice until the fullness goes away. This usually takes 2 to 3 days.  
¨ Put ice or a cold pack on your breast after nursing to reduce swelling and pain. Put a thin cloth between the ice and your skin. · Activity ¨ Eat a balanced diet. Do not try to lose weight by cutting calories. Keep taking your prenatal vitamins, or take a multivitamin. ¨ Get as much rest as you can. Try to take naps when your baby sleeps during the day. ¨ Get some exercise every day. But do not do any heavy exercise until your doctor says it is okay. ¨ Wait until you are healed (about 4 to 6 weeks) before you have sexual intercourse. Your doctor will tell you when it is okay to have sex. ¨ Talk to your doctor about birth control. You can get pregnant even before your period returns. Also, you can get pregnant while you are breastfeeding. · Mental health ¨ It is normal to have some sadness, anxiety, sleeplessness, and mood swings after you go home. If you feel upset or hopeless for more than a few days or are having trouble doing the things you need to do, talk to your doctor. · Constipation and hemorrhoids ¨ Drink plenty of fluids, enough so that your urine is light yellow or clear like water. If you have kidney, heart, or liver disease and have to limit fluids, talk with your doctor before you increase the amount of fluids you drink. ¨ Eat plenty of fiber each day. Have a bran muffin or bran cereal for breakfast, and try eating a piece of fruit for a mid-afternoon snack. ¨ For painful, itchy hemorrhoids, put ice or a cold pack on the area several times a day for 10 minutes at a time. Follow this by putting a warm compress on the area for another 10 to 20 minutes or by sitting in a shallow, warm bath. When should you call for help? Call 911 anytime you think you may need emergency care. For example, call if: 
· You are thinking of hurting yourself, your baby, or anyone else. · You have sudden, severe pain in your belly. · You passed out (lost consciousness). Call your doctor now or seek immediate medical care if: 
· You have severe vaginal bleeding. · You are soaking through a pad each hour for 2 or more hours. · Your vaginal bleeding seems to be getting heavier or is still bright red 4 days after delivery. · You are dizzy or lightheaded, or you feel like you may faint. · You are vomiting or cannot keep fluids down. · You have a fever. · You have new or more belly pain. · You pass tissue (not just blood). · Your vaginal discharge smells bad. · Your belly feels tender or full and hard. · Your breasts are continuously painful or red. · You feel sad, anxious, or hopeless for more than a few days. Watch closely for changes in your health, and be sure to contact your doctor if you have any problems. Where can you learn more? Go to http://héctor-shilo.info/. Enter V208 in the search box to learn more about \"Vaginal Childbirth: Care Instructions. \" Current as of: May 30, 2016 Content Version: 11.2 © 4580-2831 [employer]. Care instructions adapted under license by [employer] (which disclaims liability or warranty for this information). If you have questions about a medical condition or this instruction, always ask your healthcare professional. [place] [address] any warranty or liability for your use of this information. Discharge Orders Procedure Order Date Status Priority Quantity Spec Type Associated Dx CALL YOUR DOCTOR For: Other Call for fever >100.4, vaginal bleeding > 1 pad per hour, s&s of wound infection 04/11/17 0944 Normal Routine 1  Spontaneous vaginal delivery [704769] Comments:  Call for fever >100.4, vaginal bleeding > 1 pad per hour, s&s of wound infection Questions: For:  Other ACTIVITY AFTER DISCHARGE Patient should: Restrict sexual activity. Pelvic Rest 04/11/17 0944 Normal Routine 1  Spontaneous vaginal delivery [[id number]] Comments:  Pelvic Rest  
  Questions: Patient should:  Restrict sexual activity. DIET REGULAR 04/11/17 0944 Normal Routine 1  Spontaneous vaginal delivery [822101] AppsFlyer Announcement We are excited to announce that we are making your provider's discharge notes available to you in AppsFlyer. You will see these notes when they are completed and signed by the physician that discharged you from your recent hospital stay. If you have any questions or concerns about any information you see in AppsFlyer, please call the Health Information Department where you were seen or reach out to your Primary Care Provider for more information about your plan of care. Introducing [facility]! Christi Leslie introduces AppsFlyer patient portal. Now you can access parts of your medical record, email your doctor's office, and request medication refills online. 1. In your internet browser, go to [email or web address] 2. Click on the First Time User? Click Here link in the Sign In box. You will see the New Member Sign Up page. 3. Enter your AppsFlyer Access Code exactly as it appears below. You will not need to use this code after youve completed the sign-up process. If you do not sign up before the expiration date, you must request a new code. · AppsFlyer Access Code: [email or web address] Expires: 6/13/2017  9:05 AM 
 
4. Enter the last four digits of your Social Security Number (xxxx) and Date of Birth (mm/dd/yyyy) as indicated and click Submit. You will be taken to the next sign-up page. 5. Create a AppsFlyer ID. This will be your AppsFlyer login ID and cannot be changed, so think of one that is secure and easy to remember. 6. Create a AppsFlyer password. You can change your password at any time. 7. Enter your Password Reset Question and Answer. This can be used at a later time if you forget your password. 8. Enter your e-mail address. You will receive e-mail notification when new information is available in [address] 9. Click Sign Up. You can now view and download portions of your medical record. 10. Click the Download Summary menu link to download a portable copy of your medical information. If you have questions, please visit the Frequently Asked Questions section of the [employer] website. Remember, [employer] is NOT to be used for urgent needs. For medical emergencies, dial 911. Now available from your iPhone and Android! General Information Please provide this summary of care documentation to your next provider. Patient Signature:  ____________________________________________________________ Date:  ____________________________________________________________  
  
Landon Bough Provider Signature:  ____________________________________________________________ Date:  ____________________________________________________________

## 2017-04-09 NOTE — IP AVS SNAPSHOT
Current Discharge Medication List  
  
START taking these medications Dose & Instructions Dispensing Information Comments Morning Noon Evening Bedtime  
 ibuprofen 800 mg tablet Commonly known as:  MOTRIN Your last dose was: Your next dose is:    
   
   
 Dose:  800 mg Take 1 Tab by mouth every eight (8) hours as needed. Quantity:  35 Tab Refills:  1 CONTINUE these medications which have NOT CHANGED Dose & Instructions Dispensing Information Comments Morning Noon Evening Bedtime PRENATAL PO Your last dose was: Your next dose is: Take  by mouth. Refills:  0 STOP taking these medications   
 aspirin delayed-release 81 mg tablet  
   
  
 calcium carbonate 200 mg calcium (500 mg) Chew Commonly known as:  TUMS HYDROXYprogesterone caproate 250 mg/mL Syrg injection TYLENOL EXTRA STRENGTH 500 mg tablet Generic drug:  acetaminophen VITAMIN D3 2,000 unit Tab Generic drug:  cholecalciferol (vitamin D3) ZANTAC 150 mg tablet Generic drug:  raNITIdine ZyrTEC 10 mg Cap Generic drug:  Cetirizine Where to Get Your Medications These medications were sent to [facility]/pharmacy #[id number]  [address] [place] [address] Phone:  [phone]  
  ibuprofen 800 mg tablet

## 2017-04-09 NOTE — H&P
History & Physical    Name: Ange Urrutia MRN: [id number]  SSN: [id number]    YOB: 1980  Age: 29 y.o. Sex: female    Chief c/o: painful contractions    Subjective:     Estimated Date of Delivery: 17  OB History    Para Term  AB SAB TAB Ectopic Multiple Living   6 3 2 1 2 1 1 0 0 2      # Outcome Date GA Lbr Dez/2nd Weight Sex Delivery Anes PTL Lv   6 Current            5 SAB 2016 8w0d       N   4 Term 13 39w0d  3.56 kg F VAGINAL DELI EPIDURAL AN N Y   3 Term 09 38w0d 01:00 3.175 kg F Vag-Spont  N Y   2   22w0d    VAGINAL DELI   N   1 TAB                   Ms. Shelly Steele is admitted with pregnancy at 38w6d for active labor. Prenatal course was complicated by cervical incompetence. Hx 22 week loss. Hx of cerclage with this pregnancy removed last week. Please see prenatal records for details. Recent illness with flu and strep throat- treated- feeling well today except for contractions beginning this am. No loss of fluid. No vaginal bleeding.      Past Medical History:   Diagnosis Date    Asthma     as a child    Incompetent cervix     Lumbar herniated disc     l5/s1    Migraines      delivery      @22 weeks G2    Supervision of high-risk pregnancy 10/10/2016     Past Surgical History:   Procedure Laterality Date    HX BACK SURGERY      HX OTHER SURGICAL      cerclage x2    HX WISDOM TEETH EXTRACTION      REVISION CERVIX W PREG,VAG APPRCH      had cerclage placed x3     Social History     Occupational History    sales      Social History Main Topics    Smoking status: Never Smoker    Smokeless tobacco: Never Used    Alcohol use No      Comment: occ    Drug use: No    Sexual activity: Yes     Partners: Male     Birth control/ protection: None     Family History   Problem Relation Age of Onset    Diabetes Father     Hypertension Father     Heart Disease Father     High Cholesterol Father     High Cholesterol Mother     Hypertension Mother     Cancer Maternal Grandfather      lung    Breast Cancer Maternal Grandmother 60       No Known Allergies  Prior to Admission medications    Medication Sig Start Date End Date Taking? Authorizing Provider   Cetirizine (ZYRTEC) 10 mg cap Take  by mouth. Indications: as needed   Yes Historical Provider   acetaminophen (TYLENOL EXTRA STRENGTH) 500 mg tablet Take  by mouth every six (6) hours as needed for Pain. Yes Historical Provider   HYDROXYprogesterone caproate 250 mg/mL syrg injection 250 mg by IntraMUSCular route every seven (7) days. Yes Historical Provider   aspirin delayed-release 81 mg tablet Take  by mouth daily. Yes Historical Provider   cholecalciferol, vitamin D3, (VITAMIN D3) 2,000 unit tab Take  by mouth. Yes Historical Provider   PRENATAL VIT/IRON FUMARATE/FA (PRENATAL PO) Take  by mouth. Yes Historical Provider   calcium carbonate (TUMS) 200 mg calcium (500 mg) chew Take 1 Tab by mouth daily. Historical Provider   ranitidine (ZANTAC) 150 mg tablet Take 150 mg by mouth two (2) times a day. Historical Provider        Review of Systems: A comprehensive review of systems was negative except for that written in the HPI. Objective:     Vitals:  Vitals:    04/09/17 1551 04/09/17 1553   BP:  128/83   Pulse:  75   Temp:  98 °F (36.7 °C)   Weight: 93.9 kg (207 lb)    Height: 5' 10\" (1.778 m)         Physical Exam:  Patient without distress.   Heart: Regular rate and rhythm  Lung: clear to auscultation throughout lung fields, no wheezes, no rales, no rhonchi and normal respiratory effort  Back: costovertebral angle tenderness absent  Abdomen: soft, nontender  Fundus: soft and non tender  Perineum: blood absent, amniotic fluid absent  Cervical Exam:  4/90/-2- changed to 5 cm  Presenting Part: cephalic  Cervical Position: mid position  Lower Extremities:  - Edema No   - Patellar Reflexes: 1+ bilaterally  Membranes:  Intact  Fetal Heart Rate: Reactive  Variability: moderate  Accelerations: yes  Decelerations: none  Uterine contractions: irregular, every 3-5 minutes    Prenatal Labs:   Lab Results   Component Value Date/Time    ABO/Rh(D) O POS 07/09/2013 05:42 AM    Rubella, External Immune 09/22/2016    GrBStrep, External negative 06/17/2013    HBsAg, External Neg 09/22/2016    HIV, External Non Reactive 09/22/2016    RPR, External Non Reactive 09/22/2016    Gonorrhea, External neg 10/10/2016    Chlamydia, External neg 10/10/2016    ABO,Rh o+ 09/22/2016         Assessment/Plan:     Active Problems:    * No active hospital problems. *       Plan: 46 yo [id number] with painful contractions. Admit for Reassuring fetal status, Labor  Progressing normally, Continue plan for vaginal delivery. Group B Strep was negative.     Signed By:  Alicia Mckenna MD     April 9, 2017

## 2017-04-09 NOTE — PROGRESS NOTES
Pt admitted to  433 for labor. Dr Marty Coello at bedside. SVE 5/90 bulging bag. IV started and labs sent. Consents witnessed.

## 2017-04-09 NOTE — PROGRESS NOTES
Dr Hay Alves given update on pt status. MD states pt may continue to walk. Pt can have epidural when ready.

## 2017-04-09 NOTE — PROGRESS NOTES
Report rec'd from Tilton Goltz, RN. Pt care assumed. Pt resting quietly at this time. Pt voices no needs at this time.   Will continue to monitor

## 2017-04-10 PROCEDURE — 65270000029 HC RM PRIVATE

## 2017-04-10 PROCEDURE — 74011250637 HC RX REV CODE- 250/637: Performed by: OBSTETRICS & GYNECOLOGY

## 2017-04-10 RX ADMIN — IBUPROFEN 800 MG: 800 TABLET ORAL at 04:15

## 2017-04-10 NOTE — PROGRESS NOTES
Dr. Filipe Morel in to see patient at this time. SVE 5 cm. AROM with clear fluid noted. 2055- Pt requesting epidural at this time. LR bolus started.

## 2017-04-10 NOTE — ANESTHESIA POSTPROCEDURE EVALUATION
Post-Anesthesia Evaluation and Assessment    Patient: Francisco Bernstein MRN: [id number]  SSN: [id number]    YOB: 1980  Age: 29 y.o. Sex: female       Cardiovascular Function/Vital Signs  Visit Vitals    /59    Pulse 92    Temp 36.7 °C (98.1 °F)    Resp 18    Ht 5' 10\" (1.778 m)    Wt 93.9 kg (207 lb)    Breastfeeding Yes    BMI 29.7 kg/m2       Patient is status post epidural anesthesia for * No procedures listed *. Nausea/Vomiting: None    Postoperative hydration reviewed and adequate. Pain:  Pain Scale 1: Numeric (0 - 10) (04/09/17 2208)  Pain Intensity 1: 0 (04/09/17 2208)   Managed    Neurological Status:   Neuro (WDL): Exceptions to [facility] (04/09/17 2208)  Neuro  Neurologic State: Alert (04/09/17 2208)  Orientation Level: Oriented X4 (04/09/17 2208)  Cognition: Appropriate decision making; Appropriate for age attention/concentration; Appropriate safety awareness (04/09/17 2208)  Speech: Clear (04/09/17 2208)  LUE Motor Response: Purposeful (04/09/17 2208)  LLE Motor Response: Numbness (04/09/17 2208)  RUE Motor Response: Purposeful (04/09/17 2208)  RLE Motor Response: Numbness (04/09/17 2208)   At baseline    Mental Status and Level of Consciousness: Arousable    Pulmonary Status:   O2 Device: Room air (04/09/17 2208)   Adequate oxygenation and airway patent    Complications related to anesthesia: None    Post-anesthesia assessment completed.  No concerns    Signed By: Madison Wakefield MD     April 9, 2017

## 2017-04-10 NOTE — PROGRESS NOTES
Post-Partum Day Number 1 Progress Note    Patient doing well  without significant complaints. Pain controlled on current medication. Voiding without difficulty, normal lochia. Vitals:    Visit Vitals    /78 (BP 1 Location: Left arm, BP Patient Position: At rest)    Pulse (!) 56    Temp 97.8 °F (36.6 °C)    Resp 18    Ht 5' 10\" (1.778 m)    Wt 207 lb (93.9 kg)    LMP 07/11/2016 (Exact Date)    Breastfeeding Yes    BMI 29.7 kg/m2       Vital signs stable, afebrile. Exam:  Patient without distress. Heart rrr  Lungs cta b&s               Abdomen soft, fundus firm at level of umbilicus, nontender. Lower extremities are negative for swelling, cords or tenderness. Lab Results   Component Value Date/Time    ABO Group O 09/22/2016 10:19 AM    Rh (D) Positive 09/22/2016 10:19 AM    ABO/Rh(D) Dondra Frohlich POSITIVE 04/09/2017 05:20 PM        Lab Results   Component Value Date/Time    ABO/Rh(D) Dondra Frohlich POSITIVE 04/09/2017 05:20 PM    Antibody screen NEG 04/09/2017 05:20 PM    Antibody screen, External Neg 09/22/2016    Rubella, External Immune 09/22/2016    GrBStrep, External negative 06/17/2013    ABO,Rh o+ 09/22/2016     Lab Results   Component Value Date/Time    HGB 12.9 04/09/2017 05:20 PM    Hgb, External 11.7 01/24/2017         Assessment and Plan:  Patient appears to be having uncomplicated post-partum course. Continue routine post-delivery care and maternal education. Breastfeeding. Anticipate discharge home tomorrow.

## 2017-04-10 NOTE — ANESTHESIA PREPROCEDURE EVALUATION
Anesthetic History   No history of anesthetic complications            Review of Systems / Medical History  Patient summary reviewed and pertinent labs reviewed    Pulmonary  Within defined limits                 Neuro/Psych   Within defined limits           Cardiovascular  Within defined limits                Exercise tolerance: >4 METS     GI/Hepatic/Renal     GERD: well controlled           Endo/Other  Within defined limits           Other Findings              Physical Exam    Airway  Mallampati: II  TM Distance: 4 - 6 cm  Neck ROM: normal range of motion   Mouth opening: Normal     Cardiovascular  Regular rate and rhythm,  S1 and S2 normal,  no murmur, click, rub, or gallop             Dental  No notable dental hx       Pulmonary  Breath sounds clear to auscultation               Abdominal  GI exam deferred       Other Findings            Anesthetic Plan    ASA: 2  Anesthesia type: epidural      Post-op pain plan if not by surgeon: indwelling epidural catheter, epidural opioid and intrathecal opiates      Anesthetic plan and risks discussed with: Patient and Spouse

## 2017-04-10 NOTE — PROGRESS NOTES
SBAR OUT Report: Mother    Verbal report given to [facility], RN on this patient, who is now being transferred to MIU for routine progression of care. The patient is not wearing a green \"Anesthesia-Duramorph\" band. Report consisted of patient's Situation, Background, Assessment and Recommendations (SBAR). [place] ID bands were compared with the identification form, and verified with the patient and receiving nurse. Information from the SBAR, Procedure Summary, Intake/Output and MAR and the [place] Report was reviewed with the receiving nurse; opportunity for questions and clarification provided.

## 2017-04-10 NOTE — PROGRESS NOTES
Pt sitting up in position for epidural placement at 2122    Anesthesia in room at 2124    Time out performed per anesthesia at 2125    Test dose at 2129    Dose at 2131    Pt positioned right hip tilt.

## 2017-04-10 NOTE — ROUTINE PROCESS
SBAR IN Report: Mother    Verbal report received from [facility], RN (full name & credentials) on this patient, who is now being transferred from L&D (unit) for routine progression of care. The patient is not wearing a green \"Anesthesia-Duramorph\" band. Report consisted of patient's Situation, Background, Assessment and Recommendations (SBAR). [place] ID bands were compared with the identification form, and verified with the patient and transferring nurse. Information from the SBAR and the [place] Report was reviewed with the transferring nurse; opportunity for questions and clarification provided.

## 2017-04-10 NOTE — PROGRESS NOTES
Labor Progress Note  Patient seen, fetal heart rate and contraction pattern evaluated, patient examined. No data found. Physical Exam:  Cervical Exam:  5 cm dilated    90% effaced    -1 station    Presenting Part: cephalic  Membranes:  Artificial Rupture of Membranes; Amniotic Fluid: large amount of clear fluid  Uterine Activity: Frequency: Every 1-4 minutes  Fetal Heart Rate: Reactive    Assessment/Plan:  Reassuring fetal status, Continue plan for vaginal delivery, AROM to augment for spaced out contractions and minimal cervical change.

## 2017-04-10 NOTE — LACTATION NOTE
In to see mom and infant for firs time. Mom stated that infant has been latching and nursing well. She said that she has also started supplementing because she does not feel that infant is content otherwise. Mom stated that she was getting ready to breastfeed infant. Offered to assist mom but she stated that she did not need any assistance. Informed mom that lactation consultant will follow up tomorrow. Good per mom. Did not observe.

## 2017-04-10 NOTE — LACTATION NOTE

## 2017-04-10 NOTE — L&D DELIVERY NOTE
Delivery Summary    Patient: Soledad Hollingsworth MRN: [id number]  SSN: [id number]    YOB: 1980  Age: 29 y.o. Sex: female       Information for the patient's :  Francisco Kendall [[id number]]       Labor Events:    Labor: No   Rupture Date: 2017   Rupture Time: 8:48 PM   Rupture Type AROM   Amniotic Fluid Volume: Moderate    Amniotic Fluid Description: Clear     Induction: None       Augmentation: AROM   Labor Events: None     Cervical Ripening:     None     Delivery Events:  Episiotomy: None   Laceration(s): None     Repaired: None    Number of Repair Packets: 0   Suture Type and Size: None     Estimated Blood Loss (ml): 200ml       Delivery Date: 2017    Delivery Time: 9:52 PM  Delivery Type: Vaginal, Spontaneous Delivery  Sex:  Female     Gestational Age: 38w7d   Delivery Clinician: Coretta Guerrero  Living Status: Yes   Delivery Location: L&D 433          APGARS  One minute Five minutes Ten minutes   Skin color: 0   1        Heart rate: 2   2        Grimace: 2   2        Muscle tone: 2   2        Breathin   2        Totals: 8   9            Presentation: Vertex    Position: Left Occiput Anterior  Resuscitation Method:  Suctioning-bulb; Tactile Stimulation     Meconium Stained: None      Cord Vessels: 3 Vessels      Cord Events:    Cord Blood Sent?:  Yes    Blood Gases Sent?:  Yes    Placenta:  Date/Time:   9:57 PM  Removal: Spontaneous      Appearance: Normal;Intact     [place] Measurements:  Birth Weight:        Birth Length:        Head Circumference:        Chest Circumference:       Abdominal Girth:       Other Providers:   Dion Gilbert, Obstetrician;Primary Nurse;Charge Nurse;Scrub Tech           Group B Strep:   Lab Results   Component Value Date/Time    GrBStrep, External negative 2013     Information for the patient's :  Francisco Limxley [[id number]]   No results found for: ABORH, PCTABR, PCTDIG, BILI, ABORHEXT, ABORH    Lab Results   Component Value Date/Time    APH 7.393 (H) 04/09/2017 09:52 PM    APCO2 42 04/09/2017 09:52 PM    APO2 17 04/09/2017 09:52 PM    AHCO3 25 04/09/2017 09:52 PM    ABDC 0.1 04/09/2017 09:52 PM    EPHV 7.497 (H) 04/09/2017 09:52 PM    PCO2V 29 04/09/2017 09:52 PM    PO2V 24 (L) 04/09/2017 09:52 PM    HCO3V 22 04/09/2017 09:52 PM    EBDV 0.1 (L) 04/09/2017 09:52 PM    SITE CORD 04/09/2017 09:52 PM    SITE CORD 04/09/2017 09:52 PM    RSCOM na at 4 9 2017 10 03 27 PM. Not read back. 04/09/2017 09:52 PM    RSCOM na at 4 9 2017 10 06 55 PM. Not read back.  04/09/2017 09:52 PM

## 2017-04-10 NOTE — ANESTHESIA PROCEDURE NOTES
CSE Block    Start time: 4/9/2017 9:28 PM  End time: 4/9/2017 9:31 PM  Performed by: Madeleine Gupta  Authorized by: Mitali FLORES     Pre-Procedure  Indications: at surgeon's request and primary anesthetic    preanesthetic checklist: patient identified, risks and benefits discussed, anesthesia consent, site marked, patient being monitored and timeout performed    Timeout Time: 21:27        Procedure:   Patient Position:  Seated  Prep Region:  Lumbar  Prep: chlorhexidine    Location:  L2-3    Epidural Needle:   Needle Type:  Tuohy  Needle Gauge:  18 G  Injection Technique:  Loss of resistance using saline  Attempts:  1    Spinal Needle:   Needle Type:  Quincke  Needle Gauge:  25 G    Catheter:   Catheter Type:  Open end  Catheter Size:  20 G  Catheter at Skin Depth (cm):  5  Depth in Epidural Space (cm):  5  Events: no blood with aspiration, no cerebrospinal fluid with aspiration, no paresthesia, negative aspiration test and CSF confirmed    Test Dose:  Lidocaine 1.5% w/ epi and negative    Assessment:   Catheter Secured:  Tegaderm and tape  Insertion:  Uncomplicated  Patient tolerance:  Patient tolerated the procedure well with no immediate complications

## 2017-04-10 NOTE — LACTATION NOTE
This note was copied from a baby's chart. Discussed with mother her plan for feeding. Reviewed the benefits of exclusive breast milk feeding during the hospital stay. Informed her of the risks of using formula to supplement in the first few days of life as well as the benefits of successful breast milk feeding. She acknowledges understanding of information reviewed and states that it is her plan to breast milk feed exclusively her infant. Will support her choice and offer additional information as needed.

## 2017-04-11 VITALS
DIASTOLIC BLOOD PRESSURE: 77 MMHG | WEIGHT: 207 LBS | SYSTOLIC BLOOD PRESSURE: 115 MMHG | HEART RATE: 61 BPM | TEMPERATURE: 98.4 F | RESPIRATION RATE: 18 BRPM | BODY MASS INDEX: 29.63 KG/M2 | HEIGHT: 70 IN

## 2017-04-11 RX ORDER — IBUPROFEN 800 MG/1
800 TABLET ORAL
Qty: 35 TAB | Refills: 1 | Status: SHIPPED | OUTPATIENT
Start: 2017-04-11 | End: 2017-05-26

## 2017-04-11 NOTE — LACTATION NOTE
In to see mom and infant prior to discharge to home. Mom stated that feedings are going well and she is confident with no concerns. Did encourage her to follow up with our outpatient lactation consultant as needed.

## 2017-04-11 NOTE — PROGRESS NOTES
Post-Partum Day Number 2 Progress Note    Patient doing well  without significant complaints. Pain controlled on current medication. Voiding without difficulty, normal lochia. Vitals:    Visit Vitals    /77 (BP 1 Location: Right arm, BP Patient Position: At rest)    Pulse 61    Temp 98.4 °F (36.9 °C)    Resp 18    Ht 5' 10\" (1.778 m)    Wt 207 lb (93.9 kg)    LMP 07/11/2016 (Exact Date)    Breastfeeding Yes    BMI 29.7 kg/m2       Vital signs stable, afebrile. Exam:  Patient without distress. Heart rrr  Lungs cta b&s               Abdomen soft, fundus firm at level of umbilicus, nontender. Lower extremities are negative for swelling, cords or tenderness. Lab Results   Component Value Date/Time    ABO Group O 09/22/2016 10:19 AM    Rh (D) Positive 09/22/2016 10:19 AM    ABO/Rh(D) Charlette Buchananlov POSITIVE 04/09/2017 05:20 PM        Lab Results   Component Value Date/Time    ABO/Rh(D) Charlette Petersonv POSITIVE 04/09/2017 05:20 PM    Antibody screen NEG 04/09/2017 05:20 PM    Antibody screen, External Neg 09/22/2016    Rubella, External Immune 09/22/2016    GrBStrep, External negative 06/17/2013    ABO,Rh o+ 09/22/2016     Lab Results   Component Value Date/Time    HGB 12.9 04/09/2017 05:20 PM    Hgb, External 11.7 01/24/2017         Assessment and Plan:  Patient appears to be having uncomplicated post-partum course. Continue routine post-delivery care and maternal education. Breastfeeding. Will discharge home.

## 2017-04-11 NOTE — DISCHARGE INSTRUCTIONS
Vaginal Childbirth: Care Instructions  Your Care Instructions  Your body will slowly heal in the next few weeks. It is easy to get too tired and overwhelmed during the first weeks after your baby is born. Changes in your hormones can shift your mood without warning. You may find it hard to meet the extra demands on your energy and time. Take it easy on yourself. Follow-up care is a key part of your treatment and safety. Be sure to make and go to all appointments, and call your doctor if you are having problems. It's also a good idea to know your test results and keep a list of the medicines you take. How can you care for yourself at home? · Vaginal bleeding and cramps  ¨ After delivery, you will have a bloody discharge from the vagina. This will turn pink within a week and then white or yellow after about 10 days. It may last for 2 to 4 weeks or longer, until the uterus has healed. Use pads instead of tampons until you stop bleeding. ¨ Do not worry if you pass some blood clots, as long as they are smaller than a golf ball. If you have a tear or stitches in your vaginal area, change the pad at least every 4 hours to prevent soreness and infection. ¨ You may have cramps for the first few days after childbirth. These are normal and occur as the uterus shrinks to normal size. Take an over-the-counter pain medicine, such as acetaminophen (Tylenol), ibuprofen (Advil, Motrin), or naproxen (Aleve), for cramps. Read and follow all instructions on the label. Do not take aspirin, because it can cause more bleeding. ¨ Do not take two or more pain medicines at the same time unless the doctor told you to. Many pain medicines have acetaminophen, which is Tylenol. Too much acetaminophen (Tylenol) can be harmful. · Stitches  ¨ If you have stitches, they will dissolve on their own and do not need to be removed. Follow your doctor's instructions for cleaning the stitched area.   ¨ Put ice or a cold pack on your painful area for 10 to 20 minutes at a time, several times a day, for the first few days. Put a thin cloth between the ice and your skin. ¨ Sit in a few inches of warm water (sitz bath) 3 times a day and after bowel movements. The warm water helps with pain and itching. If you do not have a tub, a warm shower might help. · Breast fullness  ¨ Your breasts may overfill (engorge) in the first few days after delivery. To help milk flow and to relieve pain, warm your breasts in the shower or by using warm, moist towels before nursing. ¨ If you are not nursing, do not put warmth on your breasts or touch your breasts. Wear a tight bra or sports bra and use ice until the fullness goes away. This usually takes 2 to 3 days. ¨ Put ice or a cold pack on your breast after nursing to reduce swelling and pain. Put a thin cloth between the ice and your skin. · Activity  ¨ Eat a balanced diet. Do not try to lose weight by cutting calories. Keep taking your prenatal vitamins, or take a multivitamin. ¨ Get as much rest as you can. Try to take naps when your baby sleeps during the day. ¨ Get some exercise every day. But do not do any heavy exercise until your doctor says it is okay. ¨ Wait until you are healed (about 4 to 6 weeks) before you have sexual intercourse. Your doctor will tell you when it is okay to have sex. ¨ Talk to your doctor about birth control. You can get pregnant even before your period returns. Also, you can get pregnant while you are breastfeeding. · Mental health  ¨ It is normal to have some sadness, anxiety, sleeplessness, and mood swings after you go home. If you feel upset or hopeless for more than a few days or are having trouble doing the things you need to do, talk to your doctor. · Constipation and hemorrhoids  ¨ Drink plenty of fluids, enough so that your urine is light yellow or clear like water.  If you have kidney, heart, or liver disease and have to limit fluids, talk with your doctor before you increase the amount of fluids you drink. ¨ Eat plenty of fiber each day. Have a bran muffin or bran cereal for breakfast, and try eating a piece of fruit for a mid-afternoon snack. ¨ For painful, itchy hemorrhoids, put ice or a cold pack on the area several times a day for 10 minutes at a time. Follow this by putting a warm compress on the area for another 10 to 20 minutes or by sitting in a shallow, warm bath. When should you call for help? Call 911 anytime you think you may need emergency care. For example, call if:  · You are thinking of hurting yourself, your baby, or anyone else. · You have sudden, severe pain in your belly. · You passed out (lost consciousness). Call your doctor now or seek immediate medical care if:  · You have severe vaginal bleeding. · You are soaking through a pad each hour for 2 or more hours. · Your vaginal bleeding seems to be getting heavier or is still bright red 4 days after delivery. · You are dizzy or lightheaded, or you feel like you may faint. · You are vomiting or cannot keep fluids down. · You have a fever. · You have new or more belly pain. · You pass tissue (not just blood). · Your vaginal discharge smells bad. · Your belly feels tender or full and hard. · Your breasts are continuously painful or red. · You feel sad, anxious, or hopeless for more than a few days. Watch closely for changes in your health, and be sure to contact your doctor if you have any problems. Where can you learn more? Go to http://héctor-shilo.info/. Enter N170 in the search box to learn more about \"Vaginal Childbirth: Care Instructions. \"  Current as of: May 30, 2016  Content Version: 11.2  © 3747-1678 [employer]. Care instructions adapted under license by [employer] (which disclaims liability or warranty for this information).  If you have questions about a medical condition or this instruction, always ask your healthcare professional. [employer] disclaims any warranty or liability for your use of this information.

## 2017-04-11 NOTE — DISCHARGE SUMMARY
Obstetrical Discharge Summary     Name: Kaley Woodward MRN: [id number]  SSN: [id number]    YOB: 1980  Age: 29 y.o. Sex: female      Admit Date: 2017    Discharge Date: 2017     Admitting Physician: Ok Muniz MD     Attending Physician: Ok Muniz MD     * Admission Diagnoses: Contractions; Contractions; Normal labor    * Discharge Diagnoses:   Information for the patient's :  Aniceto Ulloa [[id number]]   Delivery of a 7 lb 12.2 oz (3.52 kg) female infant via Vaginal, Spontaneous Delivery on 2017 at 9:52 PM  by . Apgars were 8 and 9.        Additional Diagnoses:   Hospital Problems as of 2017  Date Reviewed: 2017          Codes Class Noted - Resolved POA    * (Principal)Normal labor ICD-10-CM: O80, Z37.9  ICD-9-CM: 296  2017 - Present Yes             Lab Results   Component Value Date/Time    ABO/Rh(D) O POSITIVE 2017 05:20 PM    Rubella, External Immune 2016    GrBStrep, External negative 2013    ABO,Rh o+ 2016      Immunization History   Administered Date(s) Administered    Tdap 07/10/2013       * Procedures: vaginal delivery     [place]  Depression Scale  I have been able to laugh and see the funny side of things: As much as I always could  I have looked forward with enjoyment to things: As much as I ever did  I have blamed myself unnecessarily when things went wrong: No, never  I have been anxious or worried for no good reason: No, not at all  I have felt scared or panicky for no very good reason: No, not at all  Things have been getting on top of me: No, most of the time I have coped quite well  I have been so unhappy that I have had difficulty sleeping: No, not at all  I have felt sad or miserable: No, not at all  I have been so unhappy that I have been crying: No, never  The thought of harming myself has occurred to me: Never  Total Score: 1    * Discharge Condition: good    * Hospital Course: Normal hospital course following the delivery. * Disposition: Home    Discharge Medications:   Current Discharge Medication List      START taking these medications    Details   ibuprofen (MOTRIN) 800 mg tablet Take 1 Tab by mouth every eight (8) hours as needed. Qty: 35 Tab, Refills: 1         CONTINUE these medications which have NOT CHANGED    Details   PRENATAL VIT/IRON FUMARATE/FA (PRENATAL PO) Take  by mouth. STOP taking these medications       Cetirizine (ZYRTEC) 10 mg cap Comments:   Reason for Stopping:         acetaminophen (TYLENOL EXTRA STRENGTH) 500 mg tablet Comments:   Reason for Stopping:         HYDROXYprogesterone caproate 250 mg/mL syrg injection Comments:   Reason for Stopping:         aspirin delayed-release 81 mg tablet Comments:   Reason for Stopping:         cholecalciferol, vitamin D3, (VITAMIN D3) 2,000 unit tab Comments:   Reason for Stopping:         calcium carbonate (TUMS) 200 mg calcium (500 mg) chew Comments:   Reason for Stopping:         ranitidine (ZANTAC) 150 mg tablet Comments:   Reason for Stopping:               * Follow-up Care/Patient Instructions:   Activity: No sex for 6 weeks  Diet: Regular Diet  Wound Care: As directed    Follow-up Information     Follow up With Details Comments Contact Info    Provider Unknown   Patient not available to ask             Signed By:  Bing De La Cruz DO     April 11, 2017

## 2017-05-03 PROBLEM — N88.3 INCOMPETENT CERVIX: Status: ACTIVE | Noted: 2017-05-03

## 2017-11-20 PROBLEM — N88.3 INCOMPETENT CERVIX: Status: RESOLVED | Noted: 2017-05-03 | Resolved: 2017-11-20

## 2017-11-20 PROBLEM — Z97.5 IUD (INTRAUTERINE DEVICE) IN PLACE: Status: ACTIVE | Noted: 2017-11-20

## 2018-04-11 PROBLEM — F32.A DEPRESSION: Status: ACTIVE | Noted: 2018-04-11

## 2018-04-11 PROBLEM — R53.83 FATIGUE: Status: ACTIVE | Noted: 2018-04-11

## 2018-04-27 PROBLEM — F32.1 CURRENT MODERATE EPISODE OF MAJOR DEPRESSIVE DISORDER WITHOUT PRIOR EPISODE (HCC): Status: ACTIVE | Noted: 2018-04-27
